# Patient Record
Sex: MALE | Race: WHITE
[De-identification: names, ages, dates, MRNs, and addresses within clinical notes are randomized per-mention and may not be internally consistent; named-entity substitution may affect disease eponyms.]

---

## 2017-01-13 ENCOUNTER — HOSPITAL ENCOUNTER (OUTPATIENT)
Dept: HOSPITAL 25 - OR | Age: 22
Discharge: HOME | End: 2017-01-13
Attending: OTOLARYNGOLOGY
Payer: COMMERCIAL

## 2017-01-13 VITALS — SYSTOLIC BLOOD PRESSURE: 124 MMHG | DIASTOLIC BLOOD PRESSURE: 50 MMHG

## 2017-01-13 DIAGNOSIS — J35.3: Primary | ICD-10-CM

## 2017-01-13 PROCEDURE — 88304 TISSUE EXAM BY PATHOLOGIST: CPT

## 2017-01-13 NOTE — OP
DATE OF OPERATION:  01/13/17 [facility]

 

DATE OF BIRTH:  [birth year]

 

SURGEON:  Jonathan E. Cryer, MD.

 

ASSISTANT:  None.



ANESTHESIOLOGIST:  Thony Boyer MD

 

ANESTHESIA:  General.

 

PRE-OP DIAGNOSIS:  Adenotonsillar hypertrophy.

 

POST-OP DIAGNOSIS:  Adenotonsillar hypertrophy.

 

OPERATIVE PROCEDURE:  Tonsillectomy and adenoidectomy.

 

ESTIMATED BLOOD LOSS:  Approximately 20 cc.

 

SPECIMENS:  Right and left tonsils to Pathology.  Adenoids vaporized.

 

INDICATION:  This is a 21-year-old male who has had longstanding problems with 
symptomatic adenotonsillar hypertrophy and recurrent tonsillitis.  



DESCRIPTION OF PROCEDURE:  He was brought to the operating room and general 
anesthesia was introduced.  He was orally intubated.  The table was turned 90 
degrees.  The patient was draped and a time-out was performed.  A McIvor mouth 
gag was used to facilitate exposure of the oropharynx and it was suspended from 
the Gregg stand.  The right tonsil was grasped with a straight Allis forceps, 
retracted medially and dissected free of its fossa with a Coblation device at a 
setting of 7 and  3.  There was a small amount of bleeding during this portion 
of the procedure, which was easily controlled with the coag function on the 
device.  Once the tonsil was removed, the left tonsil was then grasped with a 
straight Allis forceps and dissected free of its fossa with a Coblation device 
in the setting of 7 and 3 again with mild bleeding easily controlled.  Once the 
tonsils were removed, the device was turned up to settings of 9 and 5.  The 
superior and inferior pole regions were prophylactically cauterized. A red 
rubber catheter was then placed through the right nasal cavity, brought out 
through the mouth and used to retract the soft palate.  The adenoid bed was 
inspected, there was significant adenoid hypertrophy as well.  Adenoid tissue 
in the region of the choana and Eustachian tube orifices was vaporized with the 
coblation device at a setting of 9 and 5.  There was some bleeding in this 
portion of procedure as well, which was easily controlled with coag function as 
well as some topically applied Afrin.  Once the adenoidectomy was complete, an 
orogastric tube was passed into the stomach and stomach contents were 
evacuated.  Mouth gag was then let down for a period of a minute .  It was then 
opened again.  The tonsillar fossa were reinspected, there was no evidence of 
active bleeding.  The patient was return to the care of the anesthesiologist, 
extubated and delivered to the PACU in stable condition.

 

 [id number] #: [id number]

GAIL

## 2017-01-18 ENCOUNTER — HOSPITAL ENCOUNTER (OUTPATIENT)
Dept: HOSPITAL 25 - ED | Age: 22
Discharge: HOME | End: 2017-01-18
Attending: OTOLARYNGOLOGY
Payer: COMMERCIAL

## 2017-01-18 VITALS — DIASTOLIC BLOOD PRESSURE: 57 MMHG | SYSTOLIC BLOOD PRESSURE: 128 MMHG
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
## 2017-01-18 DIAGNOSIS — K91.840: Primary | ICD-10-CM

## 2017-01-18 LAB
ALBUMIN SERPL BCG-MCNC: 3.7 G/DL (ref 3.2–5.2)
ALP SERPL-CCNC: 59 U/L (ref 34–104)
ALT SERPL W P-5'-P-CCNC: 27 U/L (ref 7–52)
ANION GAP SERPL CALC-SCNC: 7 MMOL/L (ref 2–11)
AST SERPL-CCNC: 19 U/L (ref 13–39)
BUN SERPL-MCNC: 23 MG/DL (ref 6–24)
BUN/CREAT SERPL: 25 (ref 8–20)
CALCIUM SERPL-MCNC: 8.9 MG/DL (ref 8.6–10.3)
CHLORIDE SERPL-SCNC: 100 MMOL/L (ref 101–111)
GLOBULIN SER CALC-MCNC: 2.7 G/DL (ref 2–4)
GLUCOSE SERPL-MCNC: 229 MG/DL (ref 70–100)
HCO3 SERPL-SCNC: 24 MMOL/L (ref 22–32)
HCT VFR BLD AUTO: 36 % (ref 42–52)
HGB BLD-MCNC: 12.2 G/DL (ref 14–18)
MCH RBC QN AUTO: 28 PG (ref 27–31)
MCHC RBC AUTO-ENTMCNC: 34 G/DL (ref 31–36)
MCV RBC AUTO: 82 FL (ref 80–94)
POTASSIUM SERPL-SCNC: 4.3 MMOL/L (ref 3.5–5)
PROT SERPL-MCNC: 6.4 G/DL (ref 6.4–8.9)
RBC # BLD AUTO: 4.41 10^6/UL (ref 4–5.4)
SODIUM SERPL-SCNC: 131 MMOL/L (ref 133–145)
WBC # BLD AUTO: 9.6 10^3/UL (ref 3.5–10.8)

## 2017-01-18 PROCEDURE — 99282 EMERGENCY DEPT VISIT SF MDM: CPT

## 2017-01-18 PROCEDURE — 85730 THROMBOPLASTIN TIME PARTIAL: CPT

## 2017-01-18 PROCEDURE — 85610 PROTHROMBIN TIME: CPT

## 2017-01-18 PROCEDURE — 86900 BLOOD TYPING SEROLOGIC ABO: CPT

## 2017-01-18 PROCEDURE — 85025 COMPLETE CBC W/AUTO DIFF WBC: CPT

## 2017-01-18 PROCEDURE — 36415 COLL VENOUS BLD VENIPUNCTURE: CPT

## 2017-01-18 PROCEDURE — 86901 BLOOD TYPING SEROLOGIC RH(D): CPT

## 2017-01-18 PROCEDURE — 86850 RBC ANTIBODY SCREEN: CPT

## 2017-01-18 PROCEDURE — 80053 COMPREHEN METABOLIC PANEL: CPT

## 2017-01-18 RX ADMIN — FENTANYL CITRATE PRN MCG: 0.05 INJECTION, SOLUTION INTRAMUSCULAR; INTRAVENOUS at 06:03

## 2017-01-18 RX ADMIN — FENTANYL CITRATE PRN MCG: 0.05 INJECTION, SOLUTION INTRAMUSCULAR; INTRAVENOUS at 05:24

## 2017-01-18 RX ADMIN — FENTANYL CITRATE PRN MCG: 0.05 INJECTION, SOLUTION INTRAMUSCULAR; INTRAVENOUS at 05:51

## 2017-01-18 RX ADMIN — FENTANYL CITRATE PRN MCG: 0.05 INJECTION, SOLUTION INTRAMUSCULAR; INTRAVENOUS at 05:29

## 2017-01-18 NOTE — OP
DATE OF OPERATION:  01/18/17 [facility]

 

DATE OF BIRTH:  [birth year]

 

SURGEON:  Jonathan E. Cryer, MD

 

ASSISTANT:  None.

 

ANESTHESIOLOGIST:  Petr Vinson MD

 

ANESTHESIA:  General.

 

PRE-OP DIAGNOSIS:  Post-tonsillectomy hemorrhage.

 

POST-OP DIAGNOSIS:  Post-tonsillectomy hemorrhage.

 

OPERATIVE PROCEDURE:  Control of post-tonsillectomy hemorrhage.

 

ESTIMATED BLOOD LOSS:  Approximately  20 mL.

 

INDICATIONS:  This is a 21-year-old male who was 5 days status post 
tonsillectomy and adenoidectomy.  He presented with heavy bleeding in the 
middle of the night. Estimates are approximately 500 to 600 cc of blood loss.  
The decision was made based on history and exam in the emergency room to take 
the patient to the operating room for control of hemorrhage.

 

DESCRIPTION OF PROCEDURE:  The patient was brought to the operating room.  A 
rapid sequence induction was performed and the patient was orally intubated.  
The table was turned, patient was draped, and a time-out was performed.  At the 
time of the initial inspection, there was no evidence of active bleeding or 
clot.  A Edwina retractor was then used with a laryngeal mirror to examine the 
tonsillar fossas. With gentle manipulation of the left tonsillar fossa, brisk 
bleeding from the mid pole region began.  This appeared to be the most likely 
candidate source for his problems. Suction Bovie cautery was used to relatively 
control the bleeding from the source.  The right tonsillar fossa was also 
extensively explored.  There were no major bleeding sources in the right 
tonsillar fossa, although there was a little bit of oozing from some 
granulation tissue which was also controlled with the suction Bovie cautery.  
An orogastric tube was then passed into the stomach several times.  The stomach 
contents were evacuated.  There was a fair amount of blood in the stomach.  The 
mouth gag was then let down for a period of a minute; it was then opened again.
  The wound was copiously irrigated.  There was no evidence of active bleeding, 
and the patient returned to the care of the anesthesiologist.  He was extubated 
and delivered to the PACU in stable condition.

 

 [id number]/CPS #: [id number]

MTDD

## 2017-01-18 NOTE — ED
Aime JOY Adam, scribed for Ash De La Torre MD on 01/18/17 at 0359 .





Throat Pain/Nasal Congestion





- HPI Summary


HPI Summary: 


Pt is a 21 year old male presenting with bleeding since his tonsillectomy on 01/ 13. He states that the bleeding set on suddenly about 45 minutes PTA at the ED. 

EMS reports approximately 500-600 mL of blood. Pt states that he is unsure 

which side of the throat the blood is coming from.  








- History of Current Complaint


Chief Complaint: EDBleedingDisorder


Time Seen by Provider: 01/18/17 03:53


Hx Obtained From: Patient


Onset/Duration: Sudden Onset, Lasting Minutes, Still Present


Severity: Moderate


Related History: Other (Noted In Comments) - Tonsillectomy on 01/13





- Allergies/Home Medications


Allergies/Adverse Reactions: 


 Allergies











Allergy/AdvReac Type Severity Reaction Status Date / Time


 


Acetaminophen [From Lortab] Allergy  Rash And Verified 01/18/17 04:45





   Itching  


 


Hydrocodone [From Lortab] Allergy  Rash And Verified 01/18/17 04:45





   Itching  


 


SEASONAL ALLERGIES Allergy  STUFFY Uncoded 01/13/17 08:46














PMH/Surg Hx/FS Hx/Imm Hx


Respiratory History: Reports: Other Respiratory Problems/Disorders - TONSILS 

INFLAMED WITH A SLIGHT HEAD COLD


Sensory History: 


   Denies: Hx Contacts or Glasses, Hx Hearing Aid


Opthamlomology History: 


   Denies: Hx Contacts or Glasses





- Family History


Known Family History: Positive: Other - Negative malignant hyperthermia, 

negative anesthesia reaction





- Social History


Occupation: Student


Lives: With Family


Alcohol Use: None


Hx Substance Use: Yes


Substance Use Type: Reports: Marijuana


Substance Use Comment - Amount & Last Used: 2 X A WEEK


Hx Tobacco Use: No


Smoking Status (MU): Never Smoked Tobacco





Review of Systems


Positive: Other - Bleeding from throat after tonsillectomy


Negative: Shortness Of Breath


All Other Systems Reviewed And Are Negative: Yes





Physical Exam


Triage Information Reviewed: Yes


Vital Signs On Initial Exam: 


 Initial Vitals











Temp Pulse Resp BP Pulse Ox


 


 97.9 F   93   16   121/67   96 


 


 01/18/17 03:53  01/18/17 03:53  01/18/17 03:53  01/18/17 03:53  01/18/17 03:53











Vital Signs Reviewed: Yes


Appearance: Positive: Well-Appearing, No Pain Distress


Skin: Positive: Warm, Skin Color Reflects Adequate Perfusion, Dry


Head/Face: Positive: Normal Head/Face Inspection


Eyes: Positive: EOMI, KATE


ENT: Positive: Other - Blood in the posterior pharynx.


Neck: Positive: Supple, Nontender


Respiratory/Lung Sounds: Positive: Clear to Auscultation, Breath Sounds Present


Cardiovascular: Positive: RRR


Abdomen Description: Positive: Nontender, Soft


Bowel Sounds: Positive: Present


Musculoskeletal: Positive: Normal, Strength/ROM Intact


Neurological: Positive: Normal, Sensory/Motor Intact, Alert, Oriented to Person 

Place, Time


Psychiatric: Positive: Affect/Mood Appropriate





Diagnostics





- Vital Signs


 Vital Signs











  Temp Pulse Resp BP Pulse Ox


 


 01/18/17 03:53  97.9 F  93  16  121/67  96














- Laboratory


Lab Results: 


 Lab Results











  01/18/17 01/18/17 01/18/17 Range/Units





  04:05 04:05 04:05 


 


WBC  9.6    (3.5-10.8)  10^3/ul


 


RBC  4.41    (4.0-5.4)  10^6/ul


 


Hgb  12.2 L    (14.0-18.0)  g/dl


 


Hct  36 L    (42-52)  %


 


MCV  82    (80-94)  fL


 


MCH  28    (27-31)  pg


 


MCHC  34    (31-36)  g/dl


 


RDW  13    (10.5-15)  %


 


Plt Count  240    (150-450)  10^3/ul


 


MPV  8    (7.4-10.4)  um3


 


Neut % (Auto)  69.1    (38-83)  %


 


Lymph % (Auto)  27.2    (25-47)  %


 


Mono % (Auto)  3.5    (1-9)  %


 


Eos % (Auto)  0    (0-6)  %


 


Baso % (Auto)  0.2    (0-2)  %


 


Absolute Neuts (auto)  6.6    (1.5-7.7)  10^3/ul


 


Absolute Lymphs (auto)  2.6    (1.0-4.8)  10^3/ul


 


Absolute Monos (auto)  0.3    (0-0.8)  10^3/ul


 


Absolute Eos (auto)  0    (0-0.6)  10^3/ul


 


Absolute Basos (auto)  0    (0-0.2)  10^3/ul


 


Absolute Nucleated RBC  0.01    10^3/ul


 


Nucleated RBC %  0.1    


 


INR (Anticoag Therapy)   1.10   (0.89-1.11)  


 


APTT   32.2   (26.0-36.3)  seconds


 


Sodium    131 L  (133-145)  mmol/L


 


Potassium    4.3  (3.5-5.0)  mmol/L


 


Chloride    100 L  (101-111)  mmol/L


 


Carbon Dioxide    24  (22-32)  mmol/L


 


Anion Gap    7  (2-11)  mmol/L


 


BUN    23  (6-24)  mg/dL


 


Creatinine    0.92  (0.67-1.17)  mg/dL


 


Est GFR ( Amer)    136.3  (>60)  


 


Est GFR (Non-Af Amer)    106.0  (>60)  


 


BUN/Creatinine Ratio    25.0 H  (8-20)  


 


Glucose    229 H  ()  mg/dL


 


Calcium    8.9  (8.6-10.3)  mg/dL


 


Total Bilirubin    0.20  (0.2-1.0)  mg/dL


 


AST    19  (13-39)  U/L


 


ALT    27  (7-52)  U/L


 


Alkaline Phosphatase    59  ()  U/L


 


Total Protein    6.4  (6.4-8.9)  g/dL


 


Albumin    3.7  (3.2-5.2)  g/dL


 


Globulin    2.7  (2-4)  g/dL


 


Albumin/Globulin Ratio    1.4  (1-3)  


 


Blood Type     


 


Antibody Screen     














  01/18/17 Range/Units





  04:05 


 


WBC   (3.5-10.8)  10^3/ul


 


RBC   (4.0-5.4)  10^6/ul


 


Hgb   (14.0-18.0)  g/dl


 


Hct   (42-52)  %


 


MCV   (80-94)  fL


 


MCH   (27-31)  pg


 


MCHC   (31-36)  g/dl


 


RDW   (10.5-15)  %


 


Plt Count   (150-450)  10^3/ul


 


MPV   (7.4-10.4)  um3


 


Neut % (Auto)   (38-83)  %


 


Lymph % (Auto)   (25-47)  %


 


Mono % (Auto)   (1-9)  %


 


Eos % (Auto)   (0-6)  %


 


Baso % (Auto)   (0-2)  %


 


Absolute Neuts (auto)   (1.5-7.7)  10^3/ul


 


Absolute Lymphs (auto)   (1.0-4.8)  10^3/ul


 


Absolute Monos (auto)   (0-0.8)  10^3/ul


 


Absolute Eos (auto)   (0-0.6)  10^3/ul


 


Absolute Basos (auto)   (0-0.2)  10^3/ul


 


Absolute Nucleated RBC   10^3/ul


 


Nucleated RBC %   


 


INR (Anticoag Therapy)   (0.89-1.11)  


 


APTT   (26.0-36.3)  seconds


 


Sodium   (133-145)  mmol/L


 


Potassium   (3.5-5.0)  mmol/L


 


Chloride   (101-111)  mmol/L


 


Carbon Dioxide   (22-32)  mmol/L


 


Anion Gap   (2-11)  mmol/L


 


BUN   (6-24)  mg/dL


 


Creatinine   (0.67-1.17)  mg/dL


 


Est GFR ( Amer)   (>60)  


 


Est GFR (Non-Af Amer)   (>60)  


 


BUN/Creatinine Ratio   (8-20)  


 


Glucose   ()  mg/dL


 


Calcium   (8.6-10.3)  mg/dL


 


Total Bilirubin   (0.2-1.0)  mg/dL


 


AST   (13-39)  U/L


 


ALT   (7-52)  U/L


 


Alkaline Phosphatase   ()  U/L


 


Total Protein   (6.4-8.9)  g/dL


 


Albumin   (3.2-5.2)  g/dL


 


Globulin   (2-4)  g/dL


 


Albumin/Globulin Ratio   (1-3)  


 


Blood Type  O Positive  


 


Antibody Screen  Pending  











Result Diagrams: 


 01/18/17 04:05





 01/18/17 04:05


Lab Statement: Any lab studies that have been ordered have been reviewed, and 

results considered in the medical decision making process.





EENT Course/Dx





- Course


Assessment/Plan: DR CRYER SAW PATIENT IN ED. ADMIT TO OR STABLE.





- Diagnoses


Provider Diagnoses: 


 Tonsillar bleed








- Provider Notifications


Discussed Care of Patient with: Dr. Cryer at 04:00. He will evaluate the 

patient.





Discharge





- Discharge Plan


Condition: Stable


Disposition: ADMITTED TO [facility] documentation as recorded by the Aime walter Adam accurately reflects 

the service I personally performed and the decisions made by me, Ash De La Torre MD.

## 2018-12-05 ENCOUNTER — HOSPITAL ENCOUNTER (EMERGENCY)
Dept: HOSPITAL 25 - UCEAST | Age: 23
Discharge: LEFT BEFORE DISCHARGE | End: 2018-12-05
Payer: COMMERCIAL

## 2018-12-05 ENCOUNTER — HOSPITAL ENCOUNTER (EMERGENCY)
Dept: HOSPITAL 25 - ED | Age: 23
Discharge: HOME | End: 2018-12-05
Payer: COMMERCIAL

## 2018-12-05 VITALS — DIASTOLIC BLOOD PRESSURE: 63 MMHG | SYSTOLIC BLOOD PRESSURE: 114 MMHG

## 2018-12-05 VITALS — DIASTOLIC BLOOD PRESSURE: 66 MMHG | SYSTOLIC BLOOD PRESSURE: 126 MMHG

## 2018-12-05 DIAGNOSIS — R19.7: Primary | ICD-10-CM

## 2018-12-05 DIAGNOSIS — A08.4: ICD-10-CM

## 2018-12-05 DIAGNOSIS — K92.0: ICD-10-CM

## 2018-12-05 DIAGNOSIS — R51: ICD-10-CM

## 2018-12-05 DIAGNOSIS — Z53.21: ICD-10-CM

## 2018-12-05 DIAGNOSIS — K22.6: Primary | ICD-10-CM

## 2018-12-05 LAB
BASOPHILS # BLD AUTO: 0 10^3/UL (ref 0–0.2)
EOSINOPHIL # BLD AUTO: 0 10^3/UL (ref 0–0.6)
HCT VFR BLD AUTO: 45 % (ref 42–52)
HGB BLD-MCNC: 15.5 G/DL (ref 14–18)
LYMPHOCYTES # BLD AUTO: 0.5 10^3/UL (ref 1–4.8)
MCH RBC QN AUTO: 29 PG (ref 27–31)
MCHC RBC AUTO-ENTMCNC: 34 G/DL (ref 31–36)
MCV RBC AUTO: 84 FL (ref 80–94)
MONOCYTES # BLD AUTO: 0.5 10^3/UL (ref 0–0.8)
NEUTROPHILS # BLD AUTO: 8.5 10^3/UL (ref 1.5–7.7)
NRBC # BLD AUTO: 0 10^3/UL
NRBC BLD QL AUTO: 0.2
PLATELET # BLD AUTO: 210 10^3/UL (ref 150–450)
RBC # BLD AUTO: 5.38 10^6/UL (ref 4–5.4)
WBC # BLD AUTO: 9.6 10^3/UL (ref 3.5–10.8)

## 2018-12-05 PROCEDURE — 83690 ASSAY OF LIPASE: CPT

## 2018-12-05 PROCEDURE — 81003 URINALYSIS AUTO W/O SCOPE: CPT

## 2018-12-05 PROCEDURE — 36415 COLL VENOUS BLD VENIPUNCTURE: CPT

## 2018-12-05 PROCEDURE — 99283 EMERGENCY DEPT VISIT LOW MDM: CPT

## 2018-12-05 PROCEDURE — 96361 HYDRATE IV INFUSION ADD-ON: CPT

## 2018-12-05 PROCEDURE — 96375 TX/PRO/DX INJ NEW DRUG ADDON: CPT

## 2018-12-05 PROCEDURE — 96374 THER/PROPH/DIAG INJ IV PUSH: CPT

## 2018-12-05 PROCEDURE — 85025 COMPLETE CBC W/AUTO DIFF WBC: CPT

## 2018-12-05 PROCEDURE — 80053 COMPREHEN METABOLIC PANEL: CPT

## 2018-12-05 RX ADMIN — SODIUM CHLORIDE ONE MLS/HR: 900 IRRIGANT IRRIGATION at 15:15

## 2018-12-05 RX ADMIN — SODIUM CHLORIDE ONE MLS/HR: 900 IRRIGANT IRRIGATION at 15:43

## 2018-12-05 NOTE — ED
GI/ HPI





- History of Current Complaint


Chief Complaint: EDNauseaVomitDiarrh


Time Seen by Provider: 12/05/18 14:42


Stated Complaint: VOMITING BLOOD/ACHES


Pain Intensity: 4





- Allergy/Home Medications


Allergies/Adverse Reactions: 


 Allergies











Allergy/AdvReac Type Severity Reaction Status Date / Time


 


acetaminophen [From Lortab] Allergy  Rash And Verified 12/05/18 12:31





   Itching  


 


hydrocodone [From Lortab] Allergy  Rash And Verified 12/05/18 12:31





   Itching  


 


SEASONAL ALLERGIES Allergy  STUFFY Uncoded 01/13/17 08:46














PMH/Surg Hx/FS Hx/Imm Hx


Respiratory History: Reports: Other Respiratory Problems/Disorders - TONSILS 

INFLAMED WITH A SLIGHT HEAD COLD


Sensory History: 


   Denies: Hx Contacts or Glasses, Hx Hearing Aid


Opthamlomology History: 


   Denies: Hx Contacts or Glasses





- Surgical History


Surgery Procedure, Year, and Place: tonsils





- Immunization History


Date of Tetanus Vaccine: UTD


Date of Influenza Vaccine: none


Infectious Disease History: No


Infectious Disease History: 


   Denies: Traveled Outside the [place] in Last 30 Days





- Family History


Known Family History: Positive: Other - Negative malignant hyperthermia, 

negative anesthesia reaction





- Social History


Alcohol Use: Weekly


Hx Substance Use: Yes


Substance Use Type: Reports: Marijuana


Substance Use Comment - Amount & Last Used: 2 X A WEEK


Hx Tobacco Use: No


Smoking Status (MU): Never Smoked Tobacco





Physical Exam


Vital Signs On Initial Exam: 


 Initial Vitals











Temp Pulse Resp BP Pulse Ox


 


 98.8 F   99   18   147/83   97 


 


 12/05/18 14:40  12/05/18 14:40  12/05/18 14:40  12/05/18 14:40  12/05/18 14:40














Diagnostics





- Vital Signs


 Vital Signs











  Temp Pulse Resp BP Pulse Ox


 


 12/05/18 14:40  98.8 F  99  18  147/83  97














- Laboratory


Lab Statement: Any lab studies that have been ordered have been reviewed, and 

results considered in the medical decision making process.





Discharge





- Discharge Plan


Referrals: 


Umu Cabrera MD [Primary Care Provider] - 





- Attestation Statements


Document Initiated by Scribe: Yes

## 2018-12-05 NOTE — ED
Abdominal Pain/Male





- HPI Summary


HPI Summary: 





This patient is a 22 year old M presenting to [facility] with a chief complaint of 

nausea, diarrhea, and abdominal pain with three bouts hematemesis beginning at 

03:00 today. Patient reports roughly a tablespoon of bring red blood  with each 

bout of vomiting. Patient is unsure of fever. Reports previous hematemesis in 

October of 2018.  Patient denies previous GI issues, recent travel, or camping.





- History of Current Complaint


Chief Complaint: EDNauseaVomitDiarrh


Stated Complaint: VOMITING BLOOD/ACHES


Time Seen by Provider: 12/05/18 14:42


Hx Obtained From: Patient


Onset/Duration: Sudden Onset, Lasting Hours


Timing: Intermittent


Severity Currently: Moderate


Pain Intensity: 4


Pain Scale Used: 0-10 Numeric


Location: Diffuse


Associated Signs And Symptoms: Positive: Nausea, Vomiting, Diarrhea





- Allergies/Home Medications


Allergies/Adverse Reactions: 


 Allergies











Allergy/AdvReac Type Severity Reaction Status Date / Time


 


acetaminophen [From Lortab] Allergy  Rash And Verified 12/05/18 12:31





   Itching  


 


hydrocodone [From Lortab] Allergy  Rash And Verified 12/05/18 12:31





   Itching  


 


SEASONAL ALLERGIES Allergy  STUFFY Uncoded 01/13/17 08:46














PMH/Surg Hx/FS Hx/Imm Hx


Respiratory History: Reports: Other Respiratory Problems/Disorders - TONSILS 

INFLAMED WITH A SLIGHT HEAD COLD


GI History: 


   Denies: Hx Gastroesophageal Reflux Disease


Sensory History: 


   Denies: Hx Contacts or Glasses, Hx Hearing Aid


Opthamlomology History: 


   Denies: Hx Contacts or Glasses





- Surgical History


Surgery Procedure, Year, and Place: tonsils





- Immunization History


Date of Tetanus Vaccine: UTD


Date of Influenza Vaccine: none


Infectious Disease History: No


Infectious Disease History: 


   Denies: Traveled Outside the [place] in Last 30 Days





- Family History


Known Family History: Positive: Other - Negative malignant hyperthermia, 

negative anesthesia reaction


Family History: Crohns - sister





- Social History


Alcohol Use: Weekly


Hx Substance Use: Yes


Substance Use Type: Reports: Marijuana


Substance Use Comment - Amount & Last Used: 2 X A WEEK


Hx Tobacco Use: No


Smoking Status (MU): Never Smoked Tobacco





Review of Systems


Negative: Fever


Positive: Abdominal Pain, Vomiting - blood, Diarrhea, Nausea


All Other Systems Reviewed And Are Negative: Yes





Physical Exam





- Summary


Physical Exam Summary: 





Appearance: Well-appearing, Well-nourished, lying in bed comfortably


Skin: Warm, dry, no obvious rash


Eyes: sclera anicteric, no conjunctival pallor


ENT: mucous membranes moist, pharynx appears normal


Neck: Supple, nontender


Respiratory: Clear to auscultation, no signs of respiratory distress


Cardiovascular: Normal S1, S2. No murmurs. Normal distal pulses in tibial and 

radial bilaterally.


Abdomen: Soft, nontender, normal active bowel sounds present


Musculoskeletal: Normal, Strength/ROM Intact


Neurological: A&Ox3, awake and alert, mentation is normal, speech is fluent and 

appropriate


Psychiatric: affect is normal, does not appear anxious or depressed





Triage Information Reviewed: Yes


Vital Signs On Initial Exam: 


 Initial Vitals











Temp Pulse Resp BP Pulse Ox


 


 98.8 F   99   18   147/83   97 


 


 12/05/18 14:40  12/05/18 14:40  12/05/18 14:40  12/05/18 14:40  12/05/18 14:40











Vital Signs Reviewed: Yes





Diagnostics





- Vital Signs


 Vital Signs











  Temp Pulse Resp BP Pulse Ox


 


 12/05/18 14:40  98.8 F  99  18  147/83  97














- Laboratory


Lab Results: 


 Lab Results











  12/05/18 12/05/18 Range/Units





  15:08 15:09 


 


WBC  9.6   (3.5-10.8)  10^3/ul


 


RBC  5.38   (4.00-5.40)  10^6/ul


 


Hgb  15.5   (14.0-18.0)  g/dl


 


Hct  45   (42-52)  %


 


MCV  84   (80-94)  fL


 


MCH  29   (27-31)  pg


 


MCHC  34   (31-36)  g/dl


 


RDW  14   (10.5-15)  %


 


Plt Count  210   (150-450)  10^3/ul


 


MPV  7.7   (7.4-10.4)  fL


 


Neut % (Auto)  88.8   %


 


Lymph % (Auto)  4.9   %


 


Mono % (Auto)  5.7   %


 


Eos % (Auto)  0.4   %


 


Baso % (Auto)  0.2   %


 


Absolute Neuts (auto)  8.5 H   (1.5-7.7)  10^3/ul


 


Absolute Lymphs (auto)  0.5 L   (1.0-4.8)  10^3/ul


 


Absolute Monos (auto)  0.5   (0-0.8)  10^3/ul


 


Absolute Eos (auto)  0   (0-0.6)  10^3/ul


 


Absolute Basos (auto)  0   (0-0.2)  10^3/ul


 


Absolute Nucleated RBC  0   10^3/ul


 


Nucleated RBC %  0.2   


 


Urine Color   Yellow  


 


Urine Appearance   Clear  


 


Urine pH   6.0  (5-9)  


 


Ur Specific Gravity   1.032 H  (1.010-1.030)  


 


Urine Protein   Negative  (Negative)  


 


Urine Ketones   Negative  (Negative)  


 


Urine Blood   Negative  (Negative)  


 


Urine Nitrate   Negative  (Negative)  


 


Urine Bilirubin   Negative  (Negative)  


 


Urine Urobilinogen   Negative  (Negative)  


 


Ur Leukocyte Esterase   Negative  (Negative)  


 


Urine Glucose   Negative  (Negative)  











Result Diagrams: 


 12/05/18 15:08





 12/05/18 15:08


Lab Statement: Any lab studies that have been ordered have been reviewed, and 

results considered in the medical decision making process.





Abdominal Pain Fem Course/Dx





- Course


Course Of Treatment: A 23 y/o male presents to the ED c/o of nausea, diarrhea, 

and abdominal pain with three episodes hematemesis beginning at 03:00 today. 

Patient reports roughly a tablespoon of bright red blood coupled with vomiting. 

Patient is unsure of fever. Reports previous hematemesis in October of 2018.  

Patient denies previous GI issues, recent travel, or camping. Physical 

examination was unremarkable. No laboratory scans were done/ Hematology and 

urinalysis was done. Labs significant for 107 glucose and 7 Lipase, otherwise 

normal. In the ED course, the patient recieved Maalox, Pepcid, Xylocaine, Zofran

, and IV fluids. During re-evaluation, the patient revealed he was feeling much 

better and would like to go home. Patient will be discharged with a diagnosis 

viral gastroenteritis and Catrina-Brooks tear. Patient will be sent home with 

Bentyl and Zofran. Patient is to take these medications as prescribed. Patient 

is to follow up with primary care provider in 2-3 days. Patient is to return to 

the ED for any new or worsening symptoms. Patient is agreeable with this plan.





- Diagnoses


Provider Diagnoses: 


 Catrina-Brooks tear, Viral gastroenteritis








Discharge





- Sign-Out/Discharge


Documenting (check all that apply): Patient Departure - DISCHARGE





- Discharge Plan


Condition: Improved


Disposition: HOME


Prescriptions: 


Dicyclomine CAP* [Bentyl CAP*] 10 mg PO TID PRN #15 cap


 PRN Reason: Pain


Ondansetron ODT TAB* [Zofran 4 MG Odt TAB*] 8 mg PO Q6H PRN #10 tab.odt


 PRN Reason: Nausea


Patient Education Materials:  Dehydration (ED), Gastroenteritis (ED)


Referrals: 


Umu Cabrera MD [Primary Care Provider] - 2 Days (if not improving)


Additional Instructions: 


FOLLOW UP WITH PRIMARY CARE PROVIDER IN 2-3 DAYS.





TAKE MEDICATIONS AS PRESCRIBED.





RETURN TO ED FOR ANY NEW OR WORSENING SYMPTOMS.





- Billing Disposition and Condition


Condition: IMPROVED


Disposition: Home





- Attestation Statements


Document Initiated by Tiffany: Yes


Documenting Scribe: Joseph Osullivan


Provider For Whom Tiffany is Documenting (Include Credential): Joe Shanks MD


Scribe Attestation: 


Joseph JOY scribed for Joe Shanks MD on 12/05/18 at 1955. 


Scribe Documentation Reviewed: Yes


Provider Attestation: 


The documentation as recorded by the Joesph walter accurately reflects 

the service I personally performed and the decisions made by me, Joe Shanks MD


Status of Scribe Document: Viewed

## 2018-12-06 NOTE — UC
- Progress Note


Progress Note: 





Patient did not have an x-ray on December 5, 2018 therefore there is no 

discrepancy





Course/Dx





- Diagnoses


Provider Diagnoses: 


 Patient left without being seen








Discharge





- Sign-Out/Discharge


Documenting (check all that apply): Patient Departure


All imaging exams completed and their final reports reviewed: No Studies





- Discharge Plan


Condition: Stable


Disposition: LEFT WITHOUT BEING SEEN


Referrals: 


Umu Cabrera MD [Primary Care Provider] - 





- Billing Disposition and Condition


Condition: STABLE


Disposition: Left Without Being Seen